# Patient Record
Sex: FEMALE | Race: WHITE
[De-identification: names, ages, dates, MRNs, and addresses within clinical notes are randomized per-mention and may not be internally consistent; named-entity substitution may affect disease eponyms.]

---

## 2017-10-02 ENCOUNTER — HOSPITAL ENCOUNTER (EMERGENCY)
Dept: HOSPITAL 41 - JD.ED | Age: 77
Discharge: HOME | End: 2017-10-02
Payer: MEDICARE

## 2017-10-02 VITALS — SYSTOLIC BLOOD PRESSURE: 166 MMHG | DIASTOLIC BLOOD PRESSURE: 79 MMHG

## 2017-10-02 DIAGNOSIS — G43.909: ICD-10-CM

## 2017-10-02 DIAGNOSIS — G44.209: Primary | ICD-10-CM

## 2017-10-02 PROCEDURE — 96374 THER/PROPH/DIAG INJ IV PUSH: CPT

## 2017-10-02 PROCEDURE — 94762 N-INVAS EAR/PLS OXIMTRY CONT: CPT

## 2017-10-02 PROCEDURE — 96361 HYDRATE IV INFUSION ADD-ON: CPT

## 2017-10-02 PROCEDURE — 99284 EMERGENCY DEPT VISIT MOD MDM: CPT

## 2017-10-02 PROCEDURE — 96375 TX/PRO/DX INJ NEW DRUG ADDON: CPT

## 2017-10-02 NOTE — EDM.PDOC
ED HPI GENERAL MEDICAL PROBLEM





- General


Chief Complaint: Headache


Stated Complaint: MIGRAINE


Time Seen by Provider: 10/02/17 18:40


Source of Information: Reports: Patient


History Limitations: Reports: No Limitations





- History of Present Illness


INITIAL COMMENTS - FREE TEXT/NARRATIVE: 


Patient is a 77 year old with history of migraines who presents to the E.D. 

complaining of migraine headache. States headache came on last night. Has taken 

excedrin migraine with little relief. States normally migraines are relieved 

with excedrin.  Has also taken hydrocodone 5-325 1 tab with no relief. Headache 

is bitemporal with throbbing sensation. States it encompasses her hold head at 

times.  Has photophobia and nausea.  Headache is not described as the worst 

headache of her life.  Denies any n/t, weakness, vision changes, SOB, cp, or 

any additional complaints.  Headache currently experiencing is similar to 

previous episodes.  





  ** Head


Pain Score (Numeric/FACES): 10





- Related Data


 Allergies











Allergy/AdvReac Type Severity Reaction Status Date / Time


 


No Known Allergies Allergy   Verified 10/02/17 18:02











Home Meds: 


 Home Meds





Levothyroxine [Levothroid] 125 mcg PO MOTUWETHFRSA 07/08/14 [History]


Acetaminophen/Caffeine [Excedrin Tension Headache] 1 tab PO Q4H PRN 07/17/15 [

History]


Menthol/Methyl Salicylate [Analgesic Balm] 1 gm TOP TID 30 Days  tube 07/20/15 [

Rx]











Past Medical History


Other HEENT History: Current broken blood vessel in left eye





- Past Surgical History


Other HEENT Surgeries/Procedures: sinus surgery


Other GI Surgeries/Procedures: patient believes that colonoscopy showed polyps 

in the past


Other Musculoskeletal Surgeries/Procedures:: R TKA 10/14





Social & Family History





- Tobacco Use


Smoking Status *Q: Never Smoker


Second Hand Smoke Exposure: No





- Alcohol Use


Days Per Week of Alcohol Use: 0





- Recreational Drug Use


Recreational Drug Use: No





ED ROS GENERAL





- Review of Systems


Review Of Systems: See Below


Constitutional: Reports: Decreased Appetite.  Denies: Fever, Chills


HEENT: Denies: Ear Pain, Vision Change


Respiratory: Denies: Shortness of Breath, Cough, Sputum


Cardiovascular: Denies: Chest Pain, Dyspnea on Exertion, Palpitations


GI/Abdominal: Reports: Nausea, Vomiting.  Denies: Abdominal Pain, Diarrhea


: Denies: Dysuria


Musculoskeletal: Reports: Neck Pain (Chronic), Back Pain (Chronic)


Neurological: Reports: Headache.  Denies: Confusion, Dizziness, Numbness, 

Syncope, Tingling, Difficulty Walking, Weakness





- Physical Exam


Exam: See Below


Exam Limited By: No Limitations


General Appearance: Alert, WD/WN, Moderate Distress


Eye Exam: Bilateral Eye: EOMI, Nystagmus (None found), PERRL


Ears: Hearing Grossly Normal


Nose: Normal Inspection


Throat/Mouth: Normal Inspection, Normal Oropharynx, Normal Voice, No Airway 

Compromise


Head Exam: Atraumatic, Normocephalic


Neck: Normal Inspection, Supple, Non-Tender, Full Range of Motion.  No: 

Lymphadenopathy (L), Lymphadenopathy (R)


Respiratory/Chest: No Respiratory Distress, Lungs Clear, Normal Breath Sounds, 

No Accessory Muscle Use, Chest Non-Tender


Cardiovascular: Normal Peripheral Pulses, Regular Rate, Rhythm, No Murmur


GI/Abdominal: Normal Bowel Sounds, Soft, Non-Tender, No Organomegaly, No 

Distention


Neuro Exam (Abbreviated): Alert, Oriented, CN II-XII Intact, Normal Cognition, 

No Motor/Sensory Deficits, Other (Cerebellar function intact: Finger-nose, 

rapid alternating movements. No pronator drift, facial droop, slurred speech, 

weakness to the upper or lower extremities.)


Back Exam: Normal Inspection, Full Range of Motion


Extremities: Normal Inspection, Normal Range of Motion, Non-Tender


Psychiatric: Normal Affect, Normal Mood


Skin Exam: Warm, Dry, Intact, Normal Color





Course





- Vital Signs


Last Recorded V/S: 


 Last Vital Signs











Temp  98.5 F   10/02/17 18:00


 


Pulse  76   10/02/17 18:00


 


Resp  16   10/02/17 18:00


 


BP  166/79 H  10/02/17 18:00


 


Pulse Ox  94 L  10/02/17 19:44














- Orders/Labs/Meds


Meds: 


Medications














Discontinued Medications














Generic Name Dose Route Start Last Admin





  Trade Name Freq  PRN Reason Stop Dose Admin


 


Diphenhydramine HCl  25 mg  10/02/17 18:55  10/02/17 19:20





  Benadryl  IVPUSH  10/02/17 18:56  25 mg





  ONETIME ONE   Administration


 


Haloperidol Lactate  5 mg  10/02/17 18:55  10/02/17 19:19





  Haldol  IVPUSH  10/02/17 18:56  5 mg





  ONETIME ONE   Administration


 


Sodium Chloride  1,000 mls @ 250 mls/hr  10/02/17 18:55  10/02/17 19:18





  Normal Saline  IV  10/02/17 22:54  250 mls/hr





  ONETIME ONE   Administration


 


Ketorolac Tromethamine  15 mg  10/02/17 18:55  10/02/17 19:19





  Toradol  IVPUSH  10/02/17 18:56  15 mg





  ONETIME ONE   Administration


 


Ondansetron HCl  4 mg  10/02/17 18:55  10/02/17 19:20





  Zofran  IVPUSH  10/02/17 18:56  4 mg





  ONETIME ONE   Administration


 


Sodium Chloride  10 ml  10/02/17 18:55  10/02/17 19:26





  Saline Flush  FLUSH   10 ml





  ASDIRECTED PRN   Administration





  Keep Vein Open   














- Re-Assessments/Exams


Free Text/Narrative Re-Assessment/Exam: 





Patient has a history of migraines usually aborted with Excedrin migraine. She 

has taken 2 doses Excedrin Migraine and also hydrocodone with no relief.





Ordered peripheral IV with normal saline, Haldol 5 mg IVP, Benadryl 25 mg IVP, 

Zofran 4 mg IVP, and Toradol 15 mg IVP.





2015 reassessment, patient is sleeping. Upon awakening she has no complaints. 

She is ready be discharged home.


10/08/17 20:25








Departure





- Departure


Time of Disposition: 20:25


Disposition: Home, Self-Care 01


Condition: Good


Clinical Impression: 


 Tension-type headache, Migraine








- Discharge Information


Instructions:  Migraine Headache, Easy-to-Read


Referrals: 


Suresh Richey MD [Primary Care Provider] - 


Forms:  ED Department Discharge


Additional Instructions: 


Suggest going home finding a dark room to fall asleep with no distractions. 

Push the fluids. Refrain from driving this evening since receiving a sedative 

medication. Follow-up with PCP as needed. Return to the  ED for any new or 

worsening symptoms. Continue taking all your home medications as prescribed.

## 2021-04-04 ENCOUNTER — HOSPITAL ENCOUNTER (EMERGENCY)
Dept: HOSPITAL 41 - JD.ED | Age: 81
LOS: 1 days | Discharge: HOME | End: 2021-04-05
Payer: MEDICARE

## 2021-04-04 VITALS — DIASTOLIC BLOOD PRESSURE: 93 MMHG | HEART RATE: 88 BPM | SYSTOLIC BLOOD PRESSURE: 156 MMHG

## 2021-04-04 DIAGNOSIS — N39.0: ICD-10-CM

## 2021-04-04 DIAGNOSIS — E03.9: ICD-10-CM

## 2021-04-04 DIAGNOSIS — R07.89: Primary | ICD-10-CM

## 2021-04-04 DIAGNOSIS — R31.9: ICD-10-CM

## 2021-04-04 DIAGNOSIS — R06.02: ICD-10-CM

## 2021-04-04 DIAGNOSIS — Z20.822: ICD-10-CM

## 2021-04-04 PROCEDURE — U0002 COVID-19 LAB TEST NON-CDC: HCPCS

## 2021-04-05 NOTE — CR
Chest: Portable view of the chest was obtained.

 

Comparison: No prior chest imaging is available.

 

Heart size is normal.  Slight tortuosity of the thoracic aorta is 

seen.  Lungs are clear with no acute parenchymal change.  Minimal 

scoliosis and degenerative change is partially visualized within the 

spine.

 

Impression:

1.  Findings as noted above.

2.  Nothing acute is seen.

 

Diagnostic code #2

 

I agree with preliminary report from [facility], finalized on 04/04/21, 

10:48 PM CDT

## 2021-04-05 NOTE — CT
CT chest

 

Technique: Multiple axial sections through the chest were obtained.  

Intravenous contrast is not sufficient to allow for evaluation of 

pulmonary embolism.  

 

Comparison: Prior chest x-ray of 04/04/21.

 

Findings: Thoracic aorta shows slight atherosclerotic calcification.  

Mediastinum and hilar regions show no adenopathy.  No axillary 

adenopathy is seen.  Diffuse calcification is seen within the trachea 

and bronchi.  Minimal coronary artery calcification is seen.  No 

pericardial thickening is appreciated.  No definite acute abnormality 

is identified within the visualized upper abdomen.

 

4 mm nodule is noted within the left lung base which shows no 

calcification.  No acute parenchymal change is seen within either 

lung.

 

Bone window settings were reviewed which show diffuse disc space 

narrowing and endplate spurring within the spine.  No acute osseous 

abnormality is appreciated.

 

Impression:

1.  Non-opacified pulmonary arteries making evaluation of pulmonary 

embolism impossible.

2.  4 mm nodule within the left lung base.  If patient is a smoker, 

recommend noncontrast chest CT study in one year.  If patient is not a

 smoker, this can be ignored.

3.  Other findings as noted above which are nonacute.

 

Diagnostic code #3

 

I agree with preliminary report from [facility], finalized on 04/05/21, 1:51

 AM CDT

## 2021-04-05 NOTE — EDM.PDOC
ED HPI GENERAL MEDICAL PROBLEM





- General


Chief Complaint: Chest Pain


Stated Complaint: HEARTBURN CHEST PAIN


Time Seen by Provider: 04/04/21 19:11


Source of Information: Reports: Patient


History Limitations: Reports: No Limitations





- History of Present Illness


INITIAL COMMENTS - FREE TEXT/NARRATIVE: 





The patient has come in with a vague complaint of chest discomfort.  There is be

en no sweating aline shortness of breath nausea vomiting although patient has 

had some nausea in the past day or so.  The discomfort is mild but ongoing.  

There is been no dysuria.  No cough.  Risk factors would include hypothyroidism 

and hyperlipidemia.  She is a non-smoker.  No history of cardiological or 

respiratory problems.


  ** Chest


Pain Score (Numeric/FACES): 5





- Related Data


                                    Allergies











Allergy/AdvReac Type Severity Reaction Status Date / Time


 


No Known Allergies Allergy   Verified 04/04/21 19:16











Home Meds: 


                                    Home Meds





Levothyroxine [Levothroid] 125 mcg PO MOTUWETHFRSA 07/08/14 [History]


Acetaminophen/Caffeine [Excedrin Tension Headache] 1 tab PO Q4H PRN 07/17/15 

[History]


Menthol/Methyl Salicylate [Analgesic Balm] 1 gm TOP TID 30 Days  tube 07/20/15 

[Rx]


Levofloxacin [Levaquin] 500 mg PO DAILY #5 tablet 04/05/21 [Rx]











Past Medical History


Other HEENT History: Current broken blood vessel in left eye


Cardiovascular History: Reports: High Cholesterol





- Past Surgical History


Other HEENT Surgeries/Procedures: sinus surgery


Other GI Surgeries/Procedures: patient believes that colonoscopy showed polyps 

in the past


Other Musculoskeletal Surgeries/Procedures:: R TKA 10/14





Social & Family History





- Tobacco Use


Tobacco Use Status *Q: Never Tobacco User





ED ROS GENERAL





- Review of Systems


Review Of Systems: Comprehensive ROS is negative, except as noted in HPI.





ED EXAM, GENERAL





- Physical Exam


Exam: See Below


Free Text/Narrative:: 





On exam the patient is alert and in no distress.  She does not appear ill but 

she does appear a bit wan.  Hand is warm and dry with normal turgor.  Head 

normocephalic atraumatic.  EOMI PERRLA.  ENT grossly normal.  Neck is supple 

without jugular venous distention.  Lungs are clear breath sounds are full and 

equal bilaterally.  Heart is regular without murmur.  Abdomen is soft and 

nontender.  No flank tenderness.  No guarding or rebound.  No peripheral edema 

cyanosis or clubbing of the digits.  Neurologically she is intact with fluent 

speech and no sensory or motor deficit.  Mood and affect are normal.


  ** #1 Interpretation


EKG Date: 04/04/21


Time: 19:10


Rhythm: NSR


Rate (Beats/Min): 84


Axis: Normal


P-Wave: Present


QRS: Normal


ST-T: Normal


QT: Normal


Comparison: No Change


EKG Interpretation Comments: 


 Normal tracing stable and identical to EKG in 2015





Course





- Vital Signs


Text/Narrative:: 





Laboratory evaluations are fairly unremarkable.  TSH is low suggesting possible 

over replacement with Synthroid.  No evidence of any cardiopulmonary process.  

There is evidence of urinary tract infection however.  This may account for the 

patient's nausea though she has had no aline dysuria but she has just been 

feeling poorly for several days.  IV fluids have been ordered as well as 

Levaquin, 750 mg IV in the emergency department followed by 5 days of oral doses

 at 500 mg a day.


Last Recorded V/S: 


                                Last Vital Signs











Temp  36.2 C   04/04/21 19:13


 


Pulse  88   04/04/21 19:13


 


Resp  16   04/04/21 19:13


 


BP  156/93 H  04/04/21 19:13


 


Pulse Ox  96   04/04/21 19:13














- Orders/Labs/Meds


Orders: 


                               Active Orders 24 hr











 Category Date Time Status


 


 EKG Documentation Completion [RC] STAT Care  04/04/21 19:14 Active


 


 Peripheral IV Care [RC] .AS DIRECTED Care  04/04/21 19:16 Active


 


 CTA Chest W WO Contrast [Ang Chest] [CT] Stat Exams  04/04/21 23:28 Taken


 


 Chest 1V Frontal [CR] Stat Exams  04/04/21 19:14 Taken


 


 CULTURE URINE [RM] Stat Lab  04/04/21 21:04 Received


 


 Levofloxacin/Dextrose 5%-Water [Levaquin in D5W 750 MG/ Med  04/05/21 01:25 

Active





 150 ML] 750 mg   





 Premix Bag 1 bag   





 IV ONETIME   


 


 Sodium Chloride 0.9% [Normal Saline] 100 ml Med  04/05/21 00:45 Active





 IV ASDIRECTED   


 


 Sodium Chloride 0.9% [Normal Saline] 500 ml Med  04/05/21 01:26 Active





 IV .BOLUS   


 


 Sodium Chloride 0.9% [Saline Flush] Med  04/04/21 19:16 Active





 10 ml FLUSH ASDIRECTED PRN   


 


 Sodium Chloride 0.9% [Saline Flush] Med  04/05/21 00:45 Active





 20 ml FLUSH BOLUS   


 


 Peripheral IV Insertion Adult [OM.PC] Stat Oth  04/04/21 19:16 Ordered








                                Medication Orders





Sodium Chloride (Normal Saline)  100 mls @ 60 drops/min IV ASDIRECTED RAINA


   Last Admin: 04/05/21 01:25  Dose: 60 drops/min


   Documented by: AMADA


Levofloxacin/Dextrose 750 mg/ (Premix)  150 mls @ 100 mls/hr IV ONETIME ONE


   Stop: 04/05/21 02:54


Sodium Chloride (Normal Saline)  500 mls @ 1,000 mls/hr IV .BOLUS ONE


   Stop: 04/05/21 01:55


Sodium Chloride (Sodium Chloride 0.9% 10 Ml Syringe)  10 ml FLUSH ASDIRECTED PRN


   PRN Reason: Keep Vein Open


   Last Admin: 04/04/21 19:20  Dose: 10 ml


   Documented by: BEATRIZ


Sodium Chloride (Sodium Chloride 0.9% 10 Ml Syringe)  20 ml FLUSH BOLUS [facility]


   Last Admin: 04/05/21 00:42  Dose: 20 ml


   Documented by: SUMI








Labs: 


                                Laboratory Tests











  04/04/21 04/04/21 04/04/21 Range/Units





  19:45 21:04 21:04 


 


WBC    6.86  (3.98-10.04)  K/mm3


 


RBC    4.60  (3.98-5.22)  M/mm3


 


Hgb    13.3  (11.2-15.7)  gm/dl


 


Hct    40.9  (34.1-44.9)  %


 


MCV    88.9  (79.4-94.8)  fl


 


MCH    28.9  (25.6-32.2)  pg


 


MCHC    32.5  (32.2-35.5)  g/dl


 


RDW Std Deviation    43.3  (36.4-46.3)  fL


 


Plt Count    265  (182-369)  K/mm3


 


MPV    10.0  (9.4-12.3)  fl


 


Neutrophils % (Manual)    43  (40-60)  %


 


Band Neutrophils %    0  (0-10)  %


 


Lymphocytes % (Manual)    47 H  (20-40)  %


 


Atypical Lymphs %    0  %


 


Monocytes % (Manual)    6  (2-10)  %


 


Eosinophils % (Manual)    1  (0.7-5.8)  %


 


Basophils % (Manual)    3 H  (0.1-1.2)  


 


Platelet Estimate    Adequate  


 


RBC Morph Comment    Normal  


 


PT     (9.7-12.0)  SECONDS


 


INR     


 


APTT     (21.7-31.4)  SECONDS


 


D-Dimer, Quantitative     (0.19-0.50)  mg/L


 


Sodium     (136-145)  mEq/L


 


Potassium     (3.5-5.1)  mEq/L


 


Chloride     ()  mEq/L


 


Carbon Dioxide     (21-32)  mEq/L


 


Anion Gap     (5-15)  


 


BUN     (7-18)  mg/dL


 


Creatinine     (0.55-1.02)  mg/dL


 


Est Cr Clr Drug Dosing     mL/min


 


Estimated GFR (MDRD)     (>60)  mL/min


 


BUN/Creatinine Ratio     (14-18)  


 


Glucose     ()  mg/dL


 


Calcium     (8.5-10.1)  mg/dL


 


Magnesium     (1.8-2.4)  mg/dl


 


Total Bilirubin     (0.2-1.0)  mg/dL


 


AST     (15-37)  U/L


 


ALT     (14-59)  U/L


 


Alkaline Phosphatase     ()  U/L


 


Creatine Kinase     ()  U/L


 


Troponin I     (0.00-0.056)  ng/mL


 


C-Reactive Protein     (<1.0)  mg/dL


 


NT-Pro-B Natriuret Pep     (0-450)  pg/mL


 


Total Protein     (6.4-8.2)  g/dl


 


Albumin     (3.4-5.0)  g/dl


 


Globulin     gm/dL


 


Albumin/Globulin Ratio     (1-2)  


 


Amylase     ()  U/L


 


Lipase     ()  U/L


 


TSH 3rd Generation     (0.358-3.74)  uIU/mL


 


Urine Color   Yellow   (Yellow)  


 


Urine Appearance   Clear   (Clear)  


 


Urine pH   5.5   (5.0-8.0)  


 


Ur Specific Gravity   > or = 1.030   (1.005-1.030)  


 


Urine Protein   Negative   (Negative)  


 


Urine Glucose (UA)   Negative   (Negative)  


 


Urine Ketones   Trace H   (Negative)  


 


Urine Occult Blood   Trace-lysed H   (Negative)  


 


Urine Nitrite   Negative   (Negative)  


 


Urine Bilirubin   Negative   (Negative)  


 


Urine Urobilinogen   0.2   (0.2-1.0)  


 


Ur Leukocyte Esterase   1+ H   (Negative)  


 


Urine RBC   10-20 H   (0-5)  /hpf


 


Urine WBC   20-30 H   (0-5)  /hpf


 


Ur Squamous Epith Cells   0-5   (0-5)  /hpf


 


Urine Bacteria   Few   (FEW)  /hpf


 


Urine Mucus   Moderate H   (FEW)  /hpf


 


SARS-CoV-2 RNA (CHERYLE)  Negative    (NEGATIVE)  














  04/04/21 04/04/21 04/04/21 Range/Units





  21:04 21:04 21:04 


 


WBC     (3.98-10.04)  K/mm3


 


RBC     (3.98-5.22)  M/mm3


 


Hgb     (11.2-15.7)  gm/dl


 


Hct     (34.1-44.9)  %


 


MCV     (79.4-94.8)  fl


 


MCH     (25.6-32.2)  pg


 


MCHC     (32.2-35.5)  g/dl


 


RDW Std Deviation     (36.4-46.3)  fL


 


Plt Count     (182-369)  K/mm3


 


MPV     (9.4-12.3)  fl


 


Neutrophils % (Manual)     (40-60)  %


 


Band Neutrophils %     (0-10)  %


 


Lymphocytes % (Manual)     (20-40)  %


 


Atypical Lymphs %     %


 


Monocytes % (Manual)     (2-10)  %


 


Eosinophils % (Manual)     (0.7-5.8)  %


 


Basophils % (Manual)     (0.1-1.2)  


 


Platelet Estimate     


 


RBC Morph Comment     


 


PT    11.0  (9.7-12.0)  SECONDS


 


INR    1.03  


 


APTT    24.3  (21.7-31.4)  SECONDS


 


D-Dimer, Quantitative    0.71 H  (0.19-0.50)  mg/L


 


Sodium  143    (136-145)  mEq/L


 


Potassium  3.7    (3.5-5.1)  mEq/L


 


Chloride  105    ()  mEq/L


 


Carbon Dioxide  27    (21-32)  mEq/L


 


Anion Gap  14.7    (5-15)  


 


BUN  23 H    (7-18)  mg/dL


 


Creatinine  0.8    (0.55-1.02)  mg/dL


 


Est Cr Clr Drug Dosing  49.24    mL/min


 


Estimated GFR (MDRD)  > 60    (>60)  mL/min


 


BUN/Creatinine Ratio  28.8 H    (14-18)  


 


Glucose  119 H    ()  mg/dL


 


Calcium  10.0    (8.5-10.1)  mg/dL


 


Magnesium  2.1    (1.8-2.4)  mg/dl


 


Total Bilirubin  0.5    (0.2-1.0)  mg/dL


 


AST  23    (15-37)  U/L


 


ALT  28    (14-59)  U/L


 


Alkaline Phosphatase  88    ()  U/L


 


Creatine Kinase  189    ()  U/L


 


Troponin I  < 0.017    (0.00-0.056)  ng/mL


 


C-Reactive Protein  1.0    (<1.0)  mg/dL


 


NT-Pro-B Natriuret Pep   24   (0-450)  pg/mL


 


Total Protein  7.2    (6.4-8.2)  g/dl


 


Albumin  3.8    (3.4-5.0)  g/dl


 


Globulin  3.4    gm/dL


 


Albumin/Globulin Ratio  1.1    (1-2)  


 


Amylase  24    ()  U/L


 


Lipase  117    ()  U/L


 


TSH 3rd Generation  0.310 L    (0.358-3.74)  uIU/mL


 


Urine Color     (Yellow)  


 


Urine Appearance     (Clear)  


 


Urine pH     (5.0-8.0)  


 


Ur Specific Gravity     (1.005-1.030)  


 


Urine Protein     (Negative)  


 


Urine Glucose (UA)     (Negative)  


 


Urine Ketones     (Negative)  


 


Urine Occult Blood     (Negative)  


 


Urine Nitrite     (Negative)  


 


Urine Bilirubin     (Negative)  


 


Urine Urobilinogen     (0.2-1.0)  


 


Ur Leukocyte Esterase     (Negative)  


 


Urine RBC     (0-5)  /hpf


 


Urine WBC     (0-5)  /hpf


 


Ur Squamous Epith Cells     (0-5)  /hpf


 


Urine Bacteria     (FEW)  /hpf


 


Urine Mucus     (FEW)  /hpf


 


SARS-CoV-2 RNA (CHERYLE)     (NEGATIVE)  











Meds: 


Medications











Generic Name Dose Route Start Last Admin





  Trade Name Freq  PRN Reason Stop Dose Admin


 


Sodium Chloride  100 mls @ 60 drops/min  04/05/21 00:45  04/05/21 01:25





  Normal Saline  IV   60 drops/min





  ASDIRECTED RAINA   Administration


 


Levofloxacin/Dextrose 750 mg/  150 mls @ 100 mls/hr  04/05/21 01:25 





  Premix  IV  04/05/21 02:54 





  ONETIME ONE  


 


Sodium Chloride  500 mls @ 1,000 mls/hr  04/05/21 01:26 





  Normal Saline  IV  04/05/21 01:55 





  .BOLUS ONE  


 


Sodium Chloride  10 ml  04/04/21 19:16  04/04/21 19:20





  Sodium Chloride 0.9% 10 Ml Syringe  FLUSH   10 ml





  ASDIRECTED PRN   Administration





  Keep Vein Open  


 


Sodium Chloride  20 ml  04/05/21 00:45  04/05/21 00:42





  Sodium Chloride 0.9% 10 Ml Syringe  FLUSH   20 ml





  BOLUS RAINA   Administration














Discontinued Medications














Generic Name Dose Route Start Last Admin





  Trade Name Remedios  PRN Reason Stop Dose Admin


 


Iopamidol  100 ml  04/05/21 00:38  04/05/21 00:40





  Iopamidol 755 Mg/Ml 100 Ml Bottle  IVPUSH  04/05/21 00:39  Not Given





  ONETIME ONE  














Departure





- Departure


Time of Disposition: 01:37


Disposition: Home, Self-Care 01


Condition: Good


Clinical Impression: 


 Non-cardiac chest pain





Urinary tract infection


Qualifiers:


 Urinary tract infection type: site unspecified Hematuria presence: with 

hematuria Qualified Code(s): N39.0 - Urinary tract infection, site not 

specified; R31.9 - Hematuria, unspecified





Prescriptions: 


Levofloxacin [Levaquin] 500 mg PO DAILY #5 tablet


Referrals: 


PCP,Not In Area [Primary Care Provider] - 


Forms:  ED Department Discharge


Additional Instructions: 


You have been seen for chest discomfort some nausea and generally not feeling 

well.  There is no evidence of any cardiological or pulmonary problem at the 

present time.  There is however evidence of a urinary tract infection which is 

probably causing her discomfort.  You are being treated with Levaquin 750 mg IV 

in the emergency department and you will be sent home to take 500 mg by mouth 

daily for the next 5 days.  It is recommended that you see a primary care 

physician this coming week.  Return to the ER for any fever lack of resolution 

of symptoms chest pain shortness of breath or any troubling symptoms at all.





It is noted that your TSH is a little low.  This is not critical.  But it does 

indicate that may be your dose of levothyroxine is more than it has to be.  

Please have your primary doctor address this.  We will give you the actual value

on a separate piece of paper.





Sepsis Event Note (ED)





- Evaluation


Sepsis Screening Result: No Definite Risk





- Focused Exam


Vital Signs: 


                                   Vital Signs











  Temp Pulse Resp BP Pulse Ox


 


 04/04/21 19:13  36.2 C  88  16  156/93 H  96














- My Orders


Last 24 Hours: 


My Active Orders





04/04/21 19:14


EKG Documentation Completion [RC] STAT 


Chest 1V Frontal [CR] Stat 





04/04/21 19:16


Peripheral IV Care [RC] .AS DIRECTED 


Sodium Chloride 0.9% [Saline Flush]   10 ml FLUSH ASDIRECTED PRN 


Peripheral IV Insertion Adult [OM.PC] Stat 





04/04/21 21:04


CULTURE URINE [RM] Stat 





04/04/21 23:28


CTA Chest W WO Contrast [Ang Chest] [CT] Stat 





04/05/21 00:45


Sodium Chloride 0.9% [Normal Saline] 100 ml IV ASDIRECTED 


Sodium Chloride 0.9% [Saline Flush]   20 ml FLUSH BOLUS 





04/05/21 01:25


Levofloxacin/Dextrose 5%-Water [Levaquin in D5W 750 MG/150 ML] 750 mg   Premix 

Bag 1 bag IV ONETIME 





04/05/21 01:26


Sodium Chloride 0.9% [Normal Saline] 500 ml IV .BOLUS 














- Assessment/Plan


Last 24 Hours: 


My Active Orders





04/04/21 19:14


EKG Documentation Completion [RC] STAT 


Chest 1V Frontal [CR] Stat 





04/04/21 19:16


Peripheral IV Care [RC] .AS DIRECTED 


Sodium Chloride 0.9% [Saline Flush]   10 ml FLUSH ASDIRECTED PRN 


Peripheral IV Insertion Adult [OM.PC] Stat 





04/04/21 21:04


CULTURE URINE [RM] Stat 





04/04/21 23:28


CTA Chest W WO Contrast [Ang Chest] [CT] Stat 





04/05/21 00:45


Sodium Chloride 0.9% [Normal Saline] 100 ml IV ASDIRECTED 


Sodium Chloride 0.9% [Saline Flush]   20 ml FLUSH BOLUS 





04/05/21 01:25


Levofloxacin/Dextrose 5%-Water [Levaquin in D5W 750 MG/150 ML] 750 mg   Premix 

Bag 1 bag IV ONETIME 





04/05/21 01:26


Sodium Chloride 0.9% [Normal Saline] 500 ml IV .BOLUS

## 2021-08-21 ENCOUNTER — HOSPITAL ENCOUNTER (EMERGENCY)
Dept: HOSPITAL 41 - JD.ED | Age: 81
Discharge: HOME | End: 2021-08-21
Payer: MEDICARE

## 2021-08-21 VITALS — SYSTOLIC BLOOD PRESSURE: 145 MMHG | HEART RATE: 63 BPM | DIASTOLIC BLOOD PRESSURE: 69 MMHG

## 2021-08-21 DIAGNOSIS — E78.00: ICD-10-CM

## 2021-08-21 DIAGNOSIS — Z79.899: ICD-10-CM

## 2021-08-21 DIAGNOSIS — U07.1: Primary | ICD-10-CM

## 2021-08-21 DIAGNOSIS — K52.9: ICD-10-CM

## 2021-08-21 PROCEDURE — 71045 X-RAY EXAM CHEST 1 VIEW: CPT

## 2021-08-21 PROCEDURE — 81001 URINALYSIS AUTO W/SCOPE: CPT

## 2021-08-21 PROCEDURE — 87086 URINE CULTURE/COLONY COUNT: CPT

## 2021-08-21 PROCEDURE — 83690 ASSAY OF LIPASE: CPT

## 2021-08-21 PROCEDURE — 99284 EMERGENCY DEPT VISIT MOD MDM: CPT

## 2021-08-21 PROCEDURE — 85025 COMPLETE CBC W/AUTO DIFF WBC: CPT

## 2021-08-21 PROCEDURE — 80053 COMPREHEN METABOLIC PANEL: CPT

## 2021-08-21 PROCEDURE — 36415 COLL VENOUS BLD VENIPUNCTURE: CPT

## 2021-08-21 NOTE — CR
Chest: Frontal view of the chest was obtained.

 

Comparison: Prior chest x-ray of 04/04/21 and chest CT also performed 

on 04/04/21.

 

Heart size is normal.  Upper mediastinum is within normal limits.  

Lungs are clear with no acute parenchymal change.  No acute osseous 

abnormality is appreciated.

 

Impression:

1.  Nothing acute is seen on frontal chest x-ray.

 

Diagnostic code #1

## 2021-08-21 NOTE — EDM.PDOC
ED HPI GENERAL MEDICAL PROBLEM





- General


Chief Complaint: Gastrointestinal Problem


Stated Complaint: COVID +


Time Seen by Provider: 08/21/21 09:18





- History of Present Illness


INITIAL COMMENTS - FREE TEXT/NARRATIVE: 





81-year-old female presents the emergency room with nausea and vomiting and a 

cough.  She is Covid positive.





Patient developed a cough and typical Covid symptoms a week ago.  She was 

diagnosed last Sunday on Monday had monoclonal antibody therapy.  She is doing 

okay but has developed some dizziness and nausea and vomiting last night and 

into today.  She is not convinced she noticed any significant benefit from the 

monoclonal antibody therapy.  She denies any abdominal pain.  She has some 

arthritic discomfort that is chronic in predates her bout of Covid.  This mostly

involves her right hip and right foot.  The patient did not have the Covid 

vaccine.  She is still coughing some and at times brings up a small amount of 

sputum.  She denies any chest pain or chest pressure at this time.





- Related Data


                                    Allergies











Allergy/AdvReac Type Severity Reaction Status Date / Time


 


No Known Allergies Allergy   Verified 08/21/21 09:40











Home Meds: 


                                    Home Meds





Acetaminophen/Caffeine [Excedrin Tension Headache] 1 tab PO Q4H PRN 07/17/15 

[History]


Levothyroxine Sodium [Synthroid] 112 mcg PO DAILY 08/21/21 [History]


Ondansetron [Ondansetron ODT] 4 mg PO Q6H PRN #10 tab.rapdis 08/21/21 [Rx]


Rosuvastatin [Crestor] 5 mg PO DAILY 08/21/21 [History]











Past Medical History


Other HEENT History: Current broken blood vessel in left eye


Cardiovascular History: Reports: High Cholesterol





- Past Surgical History


Other HEENT Surgeries/Procedures: sinus surgery


Other GI Surgeries/Procedures: patient believes that colonoscopy showed polyps 

in the past


Other Musculoskeletal Surgeries/Procedures:: R TKA 10/14





ED ROS GENERAL





- Review of Systems


Review Of Systems: See Below


Constitutional: Reports: Fatigue


HEENT: Reports: No Symptoms


Respiratory: Reports: Cough, Sputum


Cardiovascular: Reports: No Symptoms


GI/Abdominal: Reports: Nausea, Vomiting.  Denies: Abdominal Pain, Constipation, 

Diarrhea


: Reports: No Symptoms


Musculoskeletal: Reports: Other (Right foot and hip pain this is a chronic 

condition)


Skin: Reports: No Symptoms


Neurological: Reports: No Symptoms


Psychiatric: Reports: No Symptoms


Hematologic/Lymphatic: Reports: No Symptoms





ED EXAM, GENERAL





- Physical Exam


Exam: See Below


Exam Limited By: No Limitations


General Appearance: Alert, No Apparent Distress


Head: Atraumatic, Normocephalic


Neck: Normal Inspection, Supple, Non-Tender, Full Range of Motion


Respiratory/Chest: No Respiratory Distress, Lungs Clear, Normal Breath Sounds


Cardiovascular: Regular Rate, Rhythm, No Edema, No Murmur


GI/Abdominal: Normal Bowel Sounds, Soft, Non-Tender.  No: Guarding, Rigid, 

Rebound


Back Exam: Normal Inspection.  No: CVA Tenderness (L), CVA Tenderness (R)


Extremities: Other (She has edema on the right side this is chronic for her 

calves are nontender)


Neurological: Alert, Oriented, Normal Cognition





Course





- Vital Signs


Last Recorded V/S: 


                                Last Vital Signs











Temp  36.2 C   08/21/21 08:57


 


Pulse  63   08/21/21 11:05


 


Resp  16   08/21/21 11:05


 


BP  145/69 H  08/21/21 11:05


 


Pulse Ox  97   08/21/21 11:05














- Orders/Labs/Meds


Orders: 


                               Active Orders 24 hr











 Category Date Time Status


 


 Chest 1V Frontal [CR] Stat Exams  08/21/21 09:33 Taken


 


 CULTURE URINE [MREF] Stat Lab  08/21/21 11:23 Received











Labs: 


                                Laboratory Tests











  08/21/21 08/21/21 08/21/21 Range/Units





  10:09 10:09 11:23 


 


WBC   5.86   (3.98-10.04)  K/mm3


 


RBC   4.53   (3.98-5.22)  M/mm3


 


Hgb   13.2   (11.2-15.7)  gm/dl


 


Hct   39.9   (34.1-44.9)  %


 


MCV   88.1   (79.4-94.8)  fl


 


MCH   29.1   (25.6-32.2)  pg


 


MCHC   33.1   (32.2-35.5)  g/dl


 


RDW Std Deviation   42.2   (36.4-46.3)  fL


 


Plt Count   235   (182-369)  K/mm3


 


MPV   9.7   (9.4-12.3)  fl


 


Neut % (Auto)   70.5   (34.0-71.1)  %


 


Lymph % (Auto)   20.1   (19.3-51.7)  %


 


Mono % (Auto)   8.7   (4.7-12.5)  %


 


Eos % (Auto)   0.5 L   (0.7-5.8)  


 


Baso % (Auto)   0.2   (0.1-1.2)  %


 


Neut # (Auto)   4.13   (1.56-6.13)  K/mm3


 


Lymph # (Auto)   1.18   (1.18-3.74)  K/mm3


 


Mono # (Auto)   0.51 H   (0.24-0.36)  K/mm3


 


Eos # (Auto)   0.03 L   (0.04-0.36)  K/mm3


 


Baso # (Auto)   0.01   (0.01-0.08)  K/mm3


 


Sodium  142    (136-145)  mEq/L


 


Potassium  3.7    (3.5-5.1)  mEq/L


 


Chloride  105    ()  mEq/L


 


Carbon Dioxide  29    (21-32)  mEq/L


 


Anion Gap  11.7    (5-15)  


 


BUN  18    (7-18)  mg/dL


 


Creatinine  0.9    (0.55-1.02)  mg/dL


 


Est Cr Clr Drug Dosing  TNP    


 


Estimated GFR (MDRD)  > 60    (>60)  mL/min


 


BUN/Creatinine Ratio  20.0 H    (14-18)  


 


Glucose  115 H    (70-99)  mg/dL


 


Calcium  8.4 L D    (8.5-10.1)  mg/dL


 


Total Bilirubin  0.5    (0.2-1.0)  mg/dL


 


AST  20    (15-37)  U/L


 


ALT  22    (14-59)  U/L


 


Alkaline Phosphatase  87    ()  U/L


 


Total Protein  7.1    (6.4-8.2)  g/dl


 


Albumin  3.6    (3.4-5.0)  g/dl


 


Globulin  3.5    gm/dL


 


Albumin/Globulin Ratio  1.0    (1-2)  


 


Lipase  150    ()  U/L


 


Urine Color    Yellow  (Yellow)  


 


Urine Appearance    Clear  (Clear)  


 


Urine pH    6.0  (5.0-8.0)  


 


Ur Specific Gravity    1.025  (1.005-1.030)  


 


Urine Protein    Negative  (Negative)  


 


Urine Glucose (UA)    Negative  (Negative)  


 


Urine Ketones    Negative  (Negative)  


 


Urine Occult Blood    Negative  (Negative)  


 


Urine Nitrite    Negative  (Negative)  


 


Urine Bilirubin    Negative  (Negative)  


 


Urine Urobilinogen    0.2  (0.2-1.0)  


 


Ur Leukocyte Esterase    1+ H  (Negative)  


 


U Hyaline Cast (Auto)    0-5  (0-5)  /lpf


 


Urine RBC    0-5  (0-5)  /hpf


 


Urine WBC    5-10 H  (0-5)  /hpf


 


Ur Squamous Epith Cells    5-10 H  (0-5)  /hpf


 


Amorphous Sediment    Rare H  (NOT SEEN)  /hpf


 


Urine Bacteria    Few  (FEW)  /hpf


 


Urine Mucus    Not seen  (FEW)  /hpf











Meds: 


Medications














Discontinued Medications














Generic Name Dose Route Start Last Admin





  Trade Name Freq  PRN Reason Stop Dose Admin


 


Ondansetron HCl  4 mg  08/21/21 09:34  08/21/21 10:14





  Ondansetron 4 Mg Tab.Dis  PO  08/21/21 09:35  Not Given





  ONETIME ONE  


 


Ondansetron HCl  Confirm  08/21/21 09:36  08/21/21 10:14





  Ondansetron 4 Mg Tab.Dis  Administered  08/21/21 09:37  4 mg





  Dose   Administration





  4 mg  





  .ROUTE  





  .STK-MED ONE  














- Re-Assessments/Exams


Free Text/Narrative Re-Assessment/Exam: 





08/21/21 09:42


I have ordered labs chest x-ray and will try her on some oral Zofran and see if 

she can keep fluids down.


08/21/21 11:54


Patient is keeping fluids down.  Labs reviewed urinalysis is probably 

contaminated she is not having symptoms we will wait and see what culture and 

sensitivity grows out I explained this to the patient.  I will give her a 

prescription for Zofran.  She will push fluids today and as needed.





Departure





- Departure


Time of Disposition: 11:56


Disposition: Home, Self-Care 01


Clinical Impression: 


 Gastroenteritis, COVID








- Discharge Information


Prescriptions: 


Ondansetron [Ondansetron ODT] 4 mg PO Q6H PRN #10 tab.rapdis


 PRN Reason: Nausea/Vomiting


Referrals: 


Alexei De León MD [Primary Care Provider] - 


Forms:  ED Department Discharge


Additional Instructions: 


Return to the emergency room with any questions problems or worsening symptoms.





I sent a prescription for Zofran, the antinausea medication to the [facility].  Have this delivered or have someone pick it up for you.  Take 1 every 6

hours as needed for nausea and vomiting.  Push lots of fluids today.





As we discussed use caution with change of position use your walker after you 

have but you have been lying down and sit up for a few minutes before trying to 

stand up and have a sturdy structure to hang onto when you first stand up.





Sepsis Event Note (ED)





- Evaluation


Sepsis Screening Result: No Definite Risk





- Focused Exam


Vital Signs: 


                                   Vital Signs











  Temp Pulse Resp BP Pulse Ox


 


 08/21/21 11:05   63  16  145/69 H  97


 


 08/21/21 08:57  36.2 C  68  18  146/72 H  96














- My Orders


Last 24 Hours: 


My Active Orders





08/21/21 09:33


Chest 1V Frontal [CR] Stat 





08/21/21 11:23


CULTURE URINE [MREF] Stat 














- Assessment/Plan


Last 24 Hours: 


My Active Orders





08/21/21 09:33


Chest 1V Frontal [CR] Stat 





08/21/21 11:23


CULTURE URINE [MREF] Stat

## 2021-12-23 ENCOUNTER — HOSPITAL ENCOUNTER (EMERGENCY)
Dept: HOSPITAL 41 - JD.ED | Age: 81
Discharge: HOME | End: 2021-12-23
Payer: MEDICARE

## 2021-12-23 VITALS — HEART RATE: 81 BPM | DIASTOLIC BLOOD PRESSURE: 75 MMHG | SYSTOLIC BLOOD PRESSURE: 143 MMHG

## 2021-12-23 DIAGNOSIS — Z79.899: ICD-10-CM

## 2021-12-23 DIAGNOSIS — Z86.16: ICD-10-CM

## 2021-12-23 DIAGNOSIS — G43.909: Primary | ICD-10-CM

## 2021-12-23 DIAGNOSIS — Z88.1: ICD-10-CM

## 2021-12-23 DIAGNOSIS — E78.00: ICD-10-CM

## 2021-12-23 PROCEDURE — 99283 EMERGENCY DEPT VISIT LOW MDM: CPT

## 2021-12-23 PROCEDURE — 96374 THER/PROPH/DIAG INJ IV PUSH: CPT

## 2021-12-23 PROCEDURE — 96375 TX/PRO/DX INJ NEW DRUG ADDON: CPT

## 2021-12-23 NOTE — EDM.PDOC
ED HPI GENERAL MEDICAL PROBLEM





- General


Chief Complaint: Headache


Stated Complaint: HEADACHE


Time Seen by Provider: 21 15:53


Source of Information: Reports: Patient


History Limitations: Reports: No Limitations, Other (ED vital signs reveal a 

temp of 98.0, pulse of 81, respiratory rate of 18, blood pressure 143/75, pulse 

ox 95% on room air.)





- History of Present Illness


INITIAL COMMENTS - FREE TEXT/NARRATIVE: 


81-year-old female presents the emergency department with complaints of migraine

headache.  Patient does have a history of migraine headaches.  She states that 

the headache started yesterday however became much worse today.  She states that

this morning she took Tylenol arthritis and this did not help, so she then 

elected to take 1 hydrocodone and some nausea medication however she does not 

remember the name of the medication.  She states that when the hydrocodone does 

not work she generally knows this time to come to the emergency department.  

Patient states the primary area of her headache as on the right temporal side 

radiating into the occipital area.  She does not have photophobia or 

phonophobia.  She does not have any blurred vision or double vision or ringing 

in her ears.  She does not have any hemiplegia.  She states she even drink a 

little caffeine to see if that would help and it has not.  She states she did 

vomit just prior to arrival in the emergency department and feels slightly 

better however still nauseated.





  ** Headache


Pain Score (Numeric/FACES): 6





- Related Data


                                    Allergies











Allergy/AdvReac Type Severity Reaction Status Date / Time


 


bacitracin Allergy  Blisters Verified 21 15:57











Home Meds: 


                                    Home Meds





Acetaminophen/Caffeine [Excedrin Tension Headache] 1 tab PO Q4H PRN 07/17/15 

[History]


Levothyroxine Sodium [Synthroid] 112 mcg PO DAILY 21 [History]


Ondansetron [Ondansetron ODT] 4 mg PO Q6H PRN #10 tab.rapdis 21 [Rx]











Past Medical History


Other HEENT History: Current broken blood vessel in left eye


Cardiovascular History: Reports: High Cholesterol


Respiratory History: Reports: None


Gastrointestinal History: Reports: Hiatal Hernia


Other Gastrointestinal History:  dx hiatal hernia


OB/GYN History: Reports: Pregnancy


Other OB/GYN History:  5 para 5


Neurological History: Reports: None


Psychiatric History: Reports: None


Endocrine/Metabolic History: Reports: Hypothyroidism


Immunologic History: Reports: None


Oncologic (Cancer) History: Reports: None


Dermatologic History: Reports: None





- Infectious Disease History


Infectious Disease History: Reports: Novel Coronavirus, SARS


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





- Past Surgical History


Head Surgeries/Procedures: Reports: None


HEENT Surgical History: Reports: Cataract Surgery


Other HEENT Surgeries/Procedures: sinus surgery


Cardiovascular Surgical History: Reports: None


Other GI Surgeries/Procedures: patient believes that colonoscopy showed polyps 

in the past


Other Musculoskeletal Surgeries/Procedures:: R TKA 10/14





Social & Family History





- Tobacco Use


Tobacco Use Status *Q: Never Tobacco User


Second Hand Smoke Exposure: No





- Caffeine Use


Caffeine Use: Reports: Coffee


Caffeine Use Comment: very rarely





- Recreational Drug Use


Recreational Drug Use: No





ED ROS GENERAL





- Review of Systems


Review Of Systems: Comprehensive ROS is negative, except as noted in HPI.





- Physical Exam


Exam: See Below


Exam Limited By: No Limitations


General Appearance: Alert, WD/WN, Mild Distress


Eye Exam: Bilateral Eye: EOMI, PERRL


Ears: Normal External Exam, Hearing Grossly Normal


Nose: Normal Inspection


Throat/Mouth: Normal Inspection, Normal Lips, Normal Voice, No Airway Compromise


Head Exam: Atraumatic, Normocephalic


Neck: Normal Inspection, Supple


Respiratory/Chest: No Respiratory Distress, No Accessory Muscle Use


Cardiovascular: Normal Peripheral Pulses, Regular Rate, Rhythm


GI/Abdominal: No Distention


 (Female) Exam: Deferred


Rectal (Female) Exam: Deferred


Neuro Exam (Abbreviated): Alert, Oriented, Normal Cognition


Back Exam: Normal Inspection, Full Range of Motion


Extremities: Normal Inspection, Normal Range of Motion, Non-Tender, No Pedal 

Edema, Normal Capillary Refill


Psychiatric: Normal Affect, Normal Mood


Skin Exam: Warm, Dry, Intact, Normal Color, No Rash





Course





- Vital Signs


Text/Narrative:: 





As stated above, patient presents with migraine headache.  Full neuro exam is 

unremarkable.  Remainder physical exam is also unremarkable.  Patient will be 

medicated with Benadryl, Toradol, Reglan and IV fluids.


Last Recorded V/S: 


                                Last Vital Signs











Temp  98.0 F   21 15:54


 


Pulse  81   21 15:54


 


Resp  18   21 15:54


 


BP  143/75 H  21 15:54


 


Pulse Ox  95   21 15:54














- Orders/Labs/Meds


Meds: 


Medications














Discontinued Medications














Generic Name Dose Route Start Last Admin





  Trade Name Remedios  PRN Reason Stop Dose Admin


 


Diphenhydramine HCl  50 mg  21 16:18  21 16:34





  Diphenhydramine 50 Mg/Ml Sdv  IVPUSH  21 16:19  50 mg





  ONETIME ONE   Administration


 


Sodium Chloride  1,000 mls @ 250 mls/hr  21 16:30  21 16:35





  Normal Saline  IV   250 mls/hr





  ASDIRECTED RAINA   Administration


 


Ketorolac Tromethamine  30 mg  21 16:18  21 16:34





  Ketorolac 30 Mg/Ml Sdv  IVPUSH  21 16:19  30 mg





  ONETIME ONE   Administration


 


Metoclopramide HCl  5 mg  21 16:18  21 16:34





  Metoclopramide 10 Mg/2 Ml Sdv  IVPUSH  21 16:19  5 mg





  ONETIME ONE   Administration














- Re-Assessments/Exams


Free Text/Narrative Re-Assessment/Exam: 





21 17:58


She states her headache discomfort is much better as she is finally able to get 

some sleep.  She would like to receive the remainder of her IV fluids however 

only have them running at 250 mils an hour.  We will allow her to stay and 

receive another 250 mL of fluids and then she will be discharged home.





Departure





- Departure


Time of Disposition: 18:37


Disposition: Home, Self-Care 01


Condition: Good


Clinical Impression: 


 Migraine








- Discharge Information


Instructions:  Migraine Headache, Easy-to-Read


Referrals: 


Alexei De León MD [Primary Care Provider] - 


Forms:  ED Department Discharge


Additional Instructions: 


You were seen in the emergency department today with complaints of migraine 

headache.  You were given IV fluids and pain and nausea medications to abort the

headache and this did seem to help.  Recommend that you go home and rest in a 

dark quiet room.  Drink plenty of fluids.  Follow-up with your primary care 

provider as needed.  Should your condition worsen or change, do not hesitate 

returning to the emergency department.

## 2022-03-15 ENCOUNTER — HOSPITAL ENCOUNTER (EMERGENCY)
Dept: HOSPITAL 41 - JD.ED | Age: 82
Discharge: HOME | End: 2022-03-15
Payer: MEDICARE

## 2022-03-15 VITALS — SYSTOLIC BLOOD PRESSURE: 159 MMHG | DIASTOLIC BLOOD PRESSURE: 85 MMHG

## 2022-03-15 VITALS — HEART RATE: 65 BPM

## 2022-03-15 DIAGNOSIS — Z79.899: ICD-10-CM

## 2022-03-15 DIAGNOSIS — G43.909: Primary | ICD-10-CM

## 2022-03-15 DIAGNOSIS — Z88.8: ICD-10-CM

## 2022-03-15 DIAGNOSIS — E03.9: ICD-10-CM

## 2022-03-15 DIAGNOSIS — E78.00: ICD-10-CM

## 2022-03-15 PROCEDURE — 70450 CT HEAD/BRAIN W/O DYE: CPT

## 2022-03-15 PROCEDURE — 99283 EMERGENCY DEPT VISIT LOW MDM: CPT

## 2022-03-15 PROCEDURE — 96374 THER/PROPH/DIAG INJ IV PUSH: CPT

## 2022-03-15 PROCEDURE — 96375 TX/PRO/DX INJ NEW DRUG ADDON: CPT
